# Patient Record
Sex: MALE | Employment: UNEMPLOYED | URBAN - METROPOLITAN AREA
[De-identification: names, ages, dates, MRNs, and addresses within clinical notes are randomized per-mention and may not be internally consistent; named-entity substitution may affect disease eponyms.]

---

## 2024-03-14 ENCOUNTER — APPOINTMENT (OUTPATIENT)
Dept: PRIMARY CARE | Facility: CLINIC | Age: 64
End: 2024-03-14
Payer: COMMERCIAL

## 2024-04-30 ENCOUNTER — APPOINTMENT (OUTPATIENT)
Dept: PRIMARY CARE | Facility: CLINIC | Age: 64
End: 2024-04-30
Payer: COMMERCIAL

## 2024-06-14 RX ORDER — CALCIUM CARBONATE/VITAMIN D3 600MG-62.5
1 CAPSULE ORAL 3 TIMES DAILY
COMMUNITY
Start: 2024-03-13

## 2024-06-14 RX ORDER — HYDROCHLOROTHIAZIDE 12.5 MG/1
12.5 CAPSULE ORAL DAILY
COMMUNITY
Start: 2024-03-13

## 2024-06-14 RX ORDER — AMLODIPINE AND BENAZEPRIL HYDROCHLORIDE 10; 40 MG/1; MG/1
1 CAPSULE ORAL DAILY
COMMUNITY
Start: 2024-03-13

## 2024-06-14 RX ORDER — OMEGA-3S/DHA/EPA/FISH OIL/D3 300MG-1000
2000 CAPSULE ORAL DAILY
COMMUNITY
Start: 2024-03-13

## 2024-06-17 ENCOUNTER — OFFICE VISIT (OUTPATIENT)
Dept: PRIMARY CARE | Facility: CLINIC | Age: 64
End: 2024-06-17
Payer: COMMERCIAL

## 2024-06-17 VITALS
DIASTOLIC BLOOD PRESSURE: 94 MMHG | RESPIRATION RATE: 16 BRPM | BODY MASS INDEX: 34.66 KG/M2 | WEIGHT: 234 LBS | HEART RATE: 86 BPM | TEMPERATURE: 97.2 F | OXYGEN SATURATION: 95 % | SYSTOLIC BLOOD PRESSURE: 148 MMHG | HEIGHT: 69 IN

## 2024-06-17 DIAGNOSIS — Z11.3 SCREENING FOR STD (SEXUALLY TRANSMITTED DISEASE): ICD-10-CM

## 2024-06-17 DIAGNOSIS — R29.818 SUSPECTED SLEEP APNEA: ICD-10-CM

## 2024-06-17 DIAGNOSIS — I10 PRIMARY HYPERTENSION: ICD-10-CM

## 2024-06-17 DIAGNOSIS — R73.03 PREDIABETES: Primary | ICD-10-CM

## 2024-06-17 DIAGNOSIS — I1A.0 RESISTANT HYPERTENSION: ICD-10-CM

## 2024-06-17 DIAGNOSIS — Z85.72 H/O DIFFUSE LARGE B-CELL LYMPHOMA: ICD-10-CM

## 2024-06-17 DIAGNOSIS — E78.6 LOW HDL (UNDER 40): ICD-10-CM

## 2024-06-17 DIAGNOSIS — J30.2 SEASONAL ALLERGIES: ICD-10-CM

## 2024-06-17 LAB
ALBUMIN SERPL BCP-MCNC: 4.6 G/DL (ref 3.4–5)
ALP SERPL-CCNC: 52 U/L (ref 33–136)
ALT SERPL W P-5'-P-CCNC: 30 U/L (ref 10–52)
ANION GAP SERPL CALC-SCNC: 17 MMOL/L (ref 10–20)
AST SERPL W P-5'-P-CCNC: 24 U/L (ref 9–39)
BILIRUB SERPL-MCNC: 1.3 MG/DL (ref 0–1.2)
BUN SERPL-MCNC: 15 MG/DL (ref 6–23)
CALCIUM SERPL-MCNC: 10 MG/DL (ref 8.6–10.6)
CHLORIDE SERPL-SCNC: 107 MMOL/L (ref 98–107)
CO2 SERPL-SCNC: 22 MMOL/L (ref 21–32)
CREAT SERPL-MCNC: 1.05 MG/DL (ref 0.5–1.3)
EGFRCR SERPLBLD CKD-EPI 2021: 79 ML/MIN/1.73M*2
ERYTHROCYTE [DISTWIDTH] IN BLOOD BY AUTOMATED COUNT: 13.5 % (ref 11.5–14.5)
GLUCOSE SERPL-MCNC: 116 MG/DL (ref 74–99)
HCT VFR BLD AUTO: 45.4 % (ref 41–52)
HGB BLD-MCNC: 14.6 G/DL (ref 13.5–17.5)
MCH RBC QN AUTO: 29.5 PG (ref 26–34)
MCHC RBC AUTO-ENTMCNC: 32.2 G/DL (ref 32–36)
MCV RBC AUTO: 92 FL (ref 80–100)
NRBC BLD-RTO: 0 /100 WBCS (ref 0–0)
PLATELET # BLD AUTO: 292 X10*3/UL (ref 150–450)
POTASSIUM SERPL-SCNC: 4 MMOL/L (ref 3.5–5.3)
PROT SERPL-MCNC: 7.5 G/DL (ref 6.4–8.2)
RBC # BLD AUTO: 4.95 X10*6/UL (ref 4.5–5.9)
SODIUM SERPL-SCNC: 142 MMOL/L (ref 136–145)
WBC # BLD AUTO: 5 X10*3/UL (ref 4.4–11.3)

## 2024-06-17 PROCEDURE — 99204 OFFICE O/P NEW MOD 45 MIN: CPT | Performed by: NURSE PRACTITIONER

## 2024-06-17 PROCEDURE — 84154 ASSAY OF PSA FREE: CPT | Performed by: NURSE PRACTITIONER

## 2024-06-17 PROCEDURE — 87491 CHLMYD TRACH DNA AMP PROBE: CPT | Performed by: NURSE PRACTITIONER

## 2024-06-17 PROCEDURE — 36415 COLL VENOUS BLD VENIPUNCTURE: CPT | Performed by: NURSE PRACTITIONER

## 2024-06-17 PROCEDURE — 87661 TRICHOMONAS VAGINALIS AMPLIF: CPT | Performed by: NURSE PRACTITIONER

## 2024-06-17 PROCEDURE — 86780 TREPONEMA PALLIDUM: CPT | Performed by: NURSE PRACTITIONER

## 2024-06-17 PROCEDURE — 3077F SYST BP >= 140 MM HG: CPT | Performed by: NURSE PRACTITIONER

## 2024-06-17 PROCEDURE — 87389 HIV-1 AG W/HIV-1&-2 AB AG IA: CPT | Performed by: NURSE PRACTITIONER

## 2024-06-17 PROCEDURE — 85027 COMPLETE CBC AUTOMATED: CPT | Performed by: NURSE PRACTITIONER

## 2024-06-17 PROCEDURE — 84075 ASSAY ALKALINE PHOSPHATASE: CPT | Performed by: NURSE PRACTITIONER

## 2024-06-17 PROCEDURE — 84443 ASSAY THYROID STIM HORMONE: CPT | Performed by: NURSE PRACTITIONER

## 2024-06-17 PROCEDURE — 99214 OFFICE O/P EST MOD 30 MIN: CPT | Performed by: NURSE PRACTITIONER

## 2024-06-17 PROCEDURE — 3080F DIAST BP >= 90 MM HG: CPT | Performed by: NURSE PRACTITIONER

## 2024-06-17 ASSESSMENT — PATIENT HEALTH QUESTIONNAIRE - PHQ9
SUM OF ALL RESPONSES TO PHQ9 QUESTIONS 1 AND 2: 0
1. LITTLE INTEREST OR PLEASURE IN DOING THINGS: NOT AT ALL
2. FEELING DOWN, DEPRESSED OR HOPELESS: NOT AT ALL

## 2024-06-17 ASSESSMENT — ENCOUNTER SYMPTOMS
DEPRESSION: 0
LOSS OF SENSATION IN FEET: 0
OCCASIONAL FEELINGS OF UNSTEADINESS: 0

## 2024-06-17 ASSESSMENT — PAIN SCALES - GENERAL: PAINLEVEL: 0-NO PAIN

## 2024-06-17 NOTE — PATIENT INSTRUCTIONS
Thank you for coming in for your visit today!    Please follow up in 4 months for blood pressure follow up    Call to schedule with sleep medicine to rule out sleep apnea.    Today we completed blood work. We will contact you with any abnormalities from this testing.    For your blood pressure:  Take your medication as directed. Try to take it around the same time daily.   Keep a log of your blood pressure. Be sure to bring it with you to your next appointment so we can review it together.  Adhere to the DASH diet. This includes decreasing your salt/sodium intake. Avoid canned foods, lunch meats, and frozen foods.  Exercise for 30 minutes daily.    A nutritionist should contact you for scheduling.     Call 911 or go to the emergency room if you have pain in your chest, difficulty breathing, or other life threatening symptoms.

## 2024-06-18 LAB
C TRACH RRNA SPEC QL NAA+PROBE: NEGATIVE
HIV 1+2 AB+HIV1 P24 AG SERPL QL IA: NONREACTIVE
N GONORRHOEA DNA SPEC QL PROBE+SIG AMP: NEGATIVE
T VAGINALIS RRNA SPEC QL NAA+PROBE: NEGATIVE
TREPONEMA PALLIDUM IGG+IGM AB [PRESENCE] IN SERUM OR PLASMA BY IMMUNOASSAY: NONREACTIVE
TSH SERPL-ACNC: 0.79 MIU/L (ref 0.44–3.98)

## 2024-06-19 LAB
PSA FREE MFR SERPL: 29 %
PSA FREE SERPL-MCNC: 0.8 NG/ML
PSA SERPL IA-MCNC: 2.8 NG/ML (ref 0–4)

## 2024-06-20 RX ORDER — FLUTICASONE PROPIONATE 50 MCG
1 SPRAY, SUSPENSION (ML) NASAL DAILY
Qty: 16 G | Refills: 11 | Status: SHIPPED | OUTPATIENT
Start: 2024-06-20 | End: 2025-06-20

## 2024-06-20 RX ORDER — CETIRIZINE HYDROCHLORIDE 10 MG/1
10 TABLET ORAL DAILY
Qty: 30 TABLET | Refills: 2 | Status: SHIPPED | OUTPATIENT
Start: 2024-06-20 | End: 2024-09-18

## 2024-07-19 ENCOUNTER — APPOINTMENT (OUTPATIENT)
Dept: NUTRITION | Facility: HOSPITAL | Age: 64
End: 2024-07-19
Payer: COMMERCIAL

## 2024-07-23 DIAGNOSIS — I10 PRIMARY HYPERTENSION: ICD-10-CM

## 2024-07-23 RX ORDER — AMLODIPINE AND BENAZEPRIL HYDROCHLORIDE 10; 40 MG/1; MG/1
1 CAPSULE ORAL DAILY
Qty: 90 CAPSULE | Refills: 0 | Status: SHIPPED | OUTPATIENT
Start: 2024-07-23

## 2024-07-23 RX ORDER — CALCIUM CARBONATE/VITAMIN D3 600MG-62.5
1 CAPSULE ORAL 3 TIMES DAILY
Qty: 90 CAPSULE | Refills: 0 | Status: SHIPPED | OUTPATIENT
Start: 2024-07-23

## 2024-08-23 ENCOUNTER — APPOINTMENT (OUTPATIENT)
Dept: NUTRITION | Facility: HOSPITAL | Age: 64
End: 2024-08-23
Payer: COMMERCIAL

## 2024-09-30 ENCOUNTER — APPOINTMENT (OUTPATIENT)
Dept: SLEEP MEDICINE | Facility: HOSPITAL | Age: 64
End: 2024-09-30
Payer: COMMERCIAL

## 2024-12-10 DIAGNOSIS — I10 PRIMARY HYPERTENSION: ICD-10-CM

## 2024-12-10 RX ORDER — AMLODIPINE AND BENAZEPRIL HYDROCHLORIDE 10; 40 MG/1; MG/1
1 CAPSULE ORAL DAILY
Qty: 30 CAPSULE | Refills: 1 | Status: SHIPPED | OUTPATIENT
Start: 2024-12-10

## 2025-05-27 ENCOUNTER — OFFICE VISIT (OUTPATIENT)
Dept: GERIATRIC MEDICINE | Facility: CLINIC | Age: 65
End: 2025-05-27
Payer: COMMERCIAL

## 2025-05-27 VITALS
DIASTOLIC BLOOD PRESSURE: 103 MMHG | RESPIRATION RATE: 16 BRPM | HEART RATE: 90 BPM | SYSTOLIC BLOOD PRESSURE: 148 MMHG | HEIGHT: 68 IN | WEIGHT: 214 LBS | BODY MASS INDEX: 32.43 KG/M2 | TEMPERATURE: 98.2 F | OXYGEN SATURATION: 97 %

## 2025-05-27 DIAGNOSIS — Z11.3 SCREEN FOR STD (SEXUALLY TRANSMITTED DISEASE): ICD-10-CM

## 2025-05-27 DIAGNOSIS — Z01.89 ENCOUNTER FOR GERIATRIC ASSESSMENT: Primary | ICD-10-CM

## 2025-05-27 DIAGNOSIS — R35.1 NOCTURIA MORE THAN TWICE PER NIGHT: ICD-10-CM

## 2025-05-27 DIAGNOSIS — Z01.00 NORMAL EYE AND VISION EXAM: ICD-10-CM

## 2025-05-27 DIAGNOSIS — Z13.220 SCREENING FOR CHOLESTEROL LEVEL: ICD-10-CM

## 2025-05-27 DIAGNOSIS — Z13.29 SCREENING FOR HYPOTHYROIDISM: ICD-10-CM

## 2025-05-27 DIAGNOSIS — Z15.09: ICD-10-CM

## 2025-05-27 DIAGNOSIS — I10 HYPERTENSION, UNSPECIFIED TYPE: ICD-10-CM

## 2025-05-27 DIAGNOSIS — Z00.00 HEALTHCARE MAINTENANCE: ICD-10-CM

## 2025-05-27 DIAGNOSIS — Z12.11 COLON CANCER SCREENING: ICD-10-CM

## 2025-05-27 DIAGNOSIS — Z12.11 ENCOUNTER FOR SCREENING FOR MALIGNANT NEOPLASM OF COLON: ICD-10-CM

## 2025-05-27 DIAGNOSIS — B35.1 FUNGAL INFECTION OF NAIL: ICD-10-CM

## 2025-05-27 DIAGNOSIS — Z86.0100 PERSONAL HISTORY OF COLON POLYPS, UNSPECIFIED: ICD-10-CM

## 2025-05-27 DIAGNOSIS — Z12.5 SCREENING FOR PROSTATE CANCER: ICD-10-CM

## 2025-05-27 DIAGNOSIS — Z13.21 ENCOUNTER FOR VITAMIN DEFICIENCY SCREENING: ICD-10-CM

## 2025-05-27 DIAGNOSIS — I10 PRIMARY HYPERTENSION: ICD-10-CM

## 2025-05-27 DIAGNOSIS — Z59.41 FOOD INSECURITY: ICD-10-CM

## 2025-05-27 PROBLEM — R31.9 HEMATURIA: Status: ACTIVE | Noted: 2021-12-30

## 2025-05-27 PROBLEM — Z86.19 HISTORY OF HEPATITIS C: Status: ACTIVE | Noted: 2019-10-11

## 2025-05-27 PROBLEM — R73.03 PREDIABETES: Status: ACTIVE | Noted: 2017-09-28

## 2025-05-27 PROBLEM — J38.7 DISEASE OF LARYNX: Status: ACTIVE | Noted: 2020-03-02

## 2025-05-27 PROBLEM — S43.422A SPRAIN OF LEFT ROTATOR CUFF CAPSULE: Status: ACTIVE | Noted: 2021-12-30

## 2025-05-27 PROBLEM — R49.0 MUSCLE TENSION DYSPHONIA: Status: ACTIVE | Noted: 2022-01-21

## 2025-05-27 PROBLEM — R59.0 CERVICAL LYMPHADENOPATHY: Status: ACTIVE | Noted: 2020-03-02

## 2025-05-27 PROBLEM — R76.8 HEPATITIS C ANTIBODY TEST POSITIVE: Status: ACTIVE | Noted: 2019-07-24

## 2025-05-27 PROBLEM — K62.5 RECTAL HEMORRHAGE: Status: ACTIVE | Noted: 2019-11-06

## 2025-05-27 PROBLEM — E66.9 OBESITY WITH BODY MASS INDEX 30 OR GREATER: Status: ACTIVE | Noted: 2018-06-08

## 2025-05-27 PROBLEM — M19.012 GLENOHUMERAL ARTHRITIS, LEFT: Status: ACTIVE | Noted: 2022-04-25

## 2025-05-27 PROBLEM — Z22.7 TB LUNG, LATENT: Status: ACTIVE | Noted: 2019-10-11

## 2025-05-27 PROBLEM — G89.29 CHRONIC RIGHT SHOULDER PAIN: Status: ACTIVE | Noted: 2022-04-25

## 2025-05-27 PROBLEM — R09.A2 GLOBUS SENSATION: Status: ACTIVE | Noted: 2022-01-21

## 2025-05-27 PROBLEM — M25.511 CHRONIC RIGHT SHOULDER PAIN: Status: ACTIVE | Noted: 2022-04-25

## 2025-05-27 PROCEDURE — 99215 OFFICE O/P EST HI 40 MIN: CPT | Performed by: NURSE PRACTITIONER

## 2025-05-27 PROCEDURE — 99417 PROLNG OP E/M EACH 15 MIN: CPT | Performed by: NURSE PRACTITIONER

## 2025-05-27 PROCEDURE — 36415 COLL VENOUS BLD VENIPUNCTURE: CPT | Performed by: NURSE PRACTITIONER

## 2025-05-27 RX ORDER — POLYETHYLENE GLYCOL 3350, SODIUM SULFATE ANHYDROUS, SODIUM BICARBONATE, SODIUM CHLORIDE, POTASSIUM CHLORIDE 236; 22.74; 6.74; 5.86; 2.97 G/4L; G/4L; G/4L; G/4L; G/4L
4 POWDER, FOR SOLUTION ORAL ONCE
Qty: 4000 ML | Refills: 0 | Status: SHIPPED | OUTPATIENT
Start: 2025-05-27 | End: 2025-05-27

## 2025-05-27 RX ORDER — AMLODIPINE AND BENAZEPRIL HYDROCHLORIDE 10; 40 MG/1; MG/1
1 CAPSULE ORAL DAILY
Qty: 90 CAPSULE | Refills: 3 | Status: SHIPPED | OUTPATIENT
Start: 2025-05-27 | End: 2026-05-27

## 2025-05-27 RX ORDER — HYDROCHLOROTHIAZIDE 12.5 MG/1
12.5 CAPSULE ORAL DAILY
Qty: 90 CAPSULE | Refills: 3 | Status: SHIPPED | OUTPATIENT
Start: 2025-05-27 | End: 2026-05-27

## 2025-05-27 RX ORDER — ACETAMINOPHEN 500 MG
1 TABLET ORAL ONCE AS NEEDED
Qty: 1 KIT | Refills: 0 | Status: SHIPPED | OUTPATIENT
Start: 2025-05-27

## 2025-05-27 SDOH — ECONOMIC STABILITY: FOOD INSECURITY: WITHIN THE PAST 12 MONTHS, THE FOOD YOU BOUGHT JUST DIDN'T LAST AND YOU DIDN'T HAVE MONEY TO GET MORE.: NEVER TRUE

## 2025-05-27 SDOH — ECONOMIC STABILITY: FOOD INSECURITY: WITHIN THE PAST 12 MONTHS, YOU WORRIED THAT YOUR FOOD WOULD RUN OUT BEFORE YOU GOT MONEY TO BUY MORE.: NEVER TRUE

## 2025-05-27 SDOH — ECONOMIC STABILITY: FOOD INSECURITY: WITHIN THE PAST 12 MONTHS, YOU WORRIED THAT YOUR FOOD WOULD RUN OUT BEFORE YOU GOT MONEY TO BUY MORE.: SOMETIMES TRUE

## 2025-05-27 SDOH — ECONOMIC STABILITY: FOOD INSECURITY: WITHIN THE PAST 12 MONTHS, THE FOOD YOU BOUGHT JUST DIDN'T LAST AND YOU DIDN'T HAVE MONEY TO GET MORE.: SOMETIMES TRUE

## 2025-05-27 SDOH — ECONOMIC STABILITY - FOOD INSECURITY: FOOD INSECURITY: Z59.41

## 2025-05-27 ASSESSMENT — ACTIVITIES OF DAILY LIVING (ADL)
GROCERY_SHOPPING: INDEPENDENT
HEARING - RIGHT EAR: FUNCTIONAL
PATIENT'S MEMORY ADEQUATE TO SAFELY COMPLETE DAILY ACTIVITIES?: YES
USING_TELEPHONE: INDEPENDENT
WALKS IN HOME: INDEPENDENT
DRESSING YOURSELF: INDEPENDENT
PILL_BOX_USED: NO
USING_TRANSPORTATION: INDEPENDENT
BATHING: INDEPENDENT
TAKING_MEDICATION: INDEPENDENT
HEARING - LEFT EAR: FUNCTIONAL
PREPARING_MEALS: INDEPENDENT
STILL_DRIVING: NO
TOILETING: INDEPENDENT
ADEQUATE_TO_COMPLETE_ADL: YES
MANAGING_FINANCES: INDEPENDENT
FEEDING YOURSELF: INDEPENDENT
DOING_HOUSEWORK: INDEPENDENT
EATING: INDEPENDENT
JUDGMENT_ADEQUATE_SAFELY_COMPLETE_DAILY_ACTIVITIES: YES
NEEDS_ASSISTANCE_WITH_FOOD: INDEPENDENT
GROOMING: INDEPENDENT

## 2025-05-27 ASSESSMENT — GERIATRIC DEPRESSION SCALE SHORT VERSION (GDS-SV)
DO YOU FEEL YOU HAVE MORE PROBLEMS WITH MEMORY THAN MOST: NO
DO YOU FEEL FULL OF ENERGY: YES
DO YOU OFTEN FEEL HELPLESS: NO
ARE YOU BASICALLY SATISFIED WITH YOUR LIFE: YES
DO YOU THINK THAT MOST PEOPLE ARE BETTER OFF THAN YOU ARE: NO
DO YOU FEEL HAPPY MOST OF THE TIME: YES
GDS TOTAL SCORE: 0
DO YOU OFTEN GET BORED: NO
DO YOU THINK IT IS WONDERFUL TO BE ALIVE NOW: YES
DO YOU FEEL THAT YOUR LIFE IS EMPTY: NO
DO YOU FEEL PRETTY WORTHLESS THE WAY YOU ARE NOW: NO
ARE YOU IN GOOD SPIRITS MOST OF THE TIME: YES
ARE YOU AFRAID THAT SOMETHING BAD IS GOING TO HAPPEN TO YOU: NO
DO YOU PREFER TO STAY AT HOME, RATHER THAN GOING OUT AND DOING NEW THINGS: NO
DO YOU FEEL THAT YOUR SITUATION IS HOPELESS: NO
HAVE YOU DROPPED MANY OF YOUR ACTIVITIES AND INTERESTS?: NO

## 2025-05-27 ASSESSMENT — ENCOUNTER SYMPTOMS
LOSS OF SENSATION IN FEET: 0
DEPRESSION: 0
OCCASIONAL FEELINGS OF UNSTEADINESS: 0

## 2025-05-27 ASSESSMENT — LIFESTYLE VARIABLES: HOW MANY STANDARD DRINKS CONTAINING ALCOHOL DO YOU HAVE ON A TYPICAL DAY: PATIENT DOES NOT DRINK

## 2025-05-27 ASSESSMENT — PATIENT HEALTH QUESTIONNAIRE - PHQ9
1. LITTLE INTEREST OR PLEASURE IN DOING THINGS: NOT AT ALL
2. FEELING DOWN, DEPRESSED OR HOPELESS: NOT AT ALL
SUM OF ALL RESPONSES TO PHQ9 QUESTIONS 1 AND 2: 0

## 2025-05-27 ASSESSMENT — PAIN SCALES - GENERAL: PAINLEVEL_OUTOF10: 0-NO PAIN

## 2025-05-27 NOTE — PROGRESS NOTES
"    Division: Geriatrics  Date of Service: 25   Visit Type: [facility] - Initial Visit  Referral requested by:  self    Chief complaint:  New Patient Visit, Hypertension, Exposure to STD, Prediabetes, and Med Refill.     Subjective   Mr. Roni Nascimento is 64 y.o. year old male and here for comprehensive geriatric assessment.  Patient is new to me but seen previously by Maryan Manuel CNP. Mr. Roni Nascimento has a PMH of:  Primary hypertension; Prediabetes; H/O diffuse large B-cell lymphoma; Hep C antibody test positive.    Here with self. Additional history provided by: EMR due to NA.    Previous Note: 2024, Maryan Manuel CNP  Subjective   Roni Nascimento is a 63 y.o. male who presents for new patient visit   HPI  Mr. Nascimento is a 62 yo M here today for NPV.    Patient with reported b-cell lymphoma in  (in remission), HTN with noncompliance, GERD, and prediabetes.     He was previously taking blood pressure medications.   Denies headache, chest pain, palpitations, blurred vision, or dizziness    He last had blood work last year with prediabetes noted. Denies polydipsia, polyuria, headache, blurred vision, or lightheadedness    Notes that he has been \"in transition\" and has just recently made changes to his diet   Family history: Grandfather  of some form of cancer in his 80s.   Father and brother with diabetes, and  of complications with diabetes.     Tobacco: Denies  ETOH: Denies   Illicits: Denies     Notes seasonal allergies for the last few weeks with runny nose and some eye drops.   He did take a medication that was OTC     GERD vs. PND- he has historically taken medication PRN for GERD.     All systems reviewed. Review of systems negative except for noted positives in HPI    Objective     BP (!) 148/94   Pulse 86   Temp 36.2 °C (97.2 °F)   Resp 16   Ht 1.753 m (5' 9\")   Wt 106 kg (234 lb)   SpO2 95%   BMI 34.56 kg/m²    Vital signs noted and reviewed.     Physical " Exam  Constitutional:       Appearance: Normal appearance.   Cardiovascular:      Rate and Rhythm: Normal rate and regular rhythm.   Pulmonary:      Effort: Pulmonary effort is normal. No respiratory distress.      Breath sounds: Normal breath sounds.   Skin:     General: Skin is warm and dry.   Neurological:      Mental Status: He is oriented to person, place, and time.   Psychiatric:         Mood and Affect: Mood normal.     Assessment/Plan   Problem List Items Addressed This Visit      H/O diffuse large B-cell lymphoma   Relevant Orders   PSA, total and free (Completed)   Primary hypertension   Relevant Orders   Referral to Nutrition Services   Comprehensive metabolic panel (Completed)   CBC (Completed)    Other Visit Diagnoses      Prediabetes    -  Primary   Relevant Orders   Referral to Nutrition Services   Comprehensive metabolic panel (Completed)   Suspected sleep apnea       Relevant Orders   Referral to Adult Sleep Medicine   Resistant hypertension       Relevant Orders   TSH with reflex to Free T4 if abnormal (Completed)   Low HDL (under 40)       Screening for STD (sexually transmitted disease)       Relevant Orders   Syphilis Screen with Reflex (Completed)   C. Trachomatis / N. Gonorrhoeae, Amplified Detection (Completed)   Trichomonas vaginalis, Nucleic Acid Detection (Completed)   HIV 1/2 Antigen/Antibody Screen with Reflex to Confirmation (Completed)   Seasonal allergies       Relevant Medications   fluticasone (Flonase) 50 mcg/actuation nasal spray   cetirizine (ZyrTEC) 10 mg tablet  ==========================================    HPI  Roni Nascimento is a pleasant 64 y.o. AA male who presents today for geriatric assessment and to establish care. This patient is a former patient of KATHRYN Bautista. Their last visit with LILIAM Manuel was 6/17/2024 for NPV. Today, patient presents for: New Patient Visit; Hypertension; Exposure to STD; Prediabetes; Med Refill. PMH significant for Primary hypertension;  Prediabetes; H/O diffuse large B-cell lymphoma; Hep C antibody test positive; arthritis; Hematuria; Muscle tension dysphonia; Obesity with body mass index 30 or greater; Sciatica; and TB lung, latent. Last brain imaging: None. MoCA/MMSE: None. Pt came alone so the history is as accurate as and limited by his own report/memory. Main concerns: has run out of all medications (previously Lost to Care), especially concerned about hypertension, needs updated labs, requesting STD screening tests. Shared family history of diabetes, personal h/o lymphoma, bilateral torn rotator cuffs, need to see podiatrist, eye doctor. Reports often runs out of food by the end of the month.         What matters most: Establishing care to get meds refilled    PCP: LUPE Blair-JUSTO    Medical records reviewed  Medical History[1]  Surgical History[2]  Family History[3]  Social History     Tobacco Use    Smoking status: Never    Smokeless tobacco: Never   Substance Use Topics    Alcohol use: Never       Geriatric ROS:  Visual: glasses No Last exam: 2023  Hearing: hearing N/A Last exam: NA  Dental: dentures No Last exam: 2024  Sleep: how many hours at night 6+, sleep aides: drinking too much, up to BR 2-3+  History of Frequent falls: no  Mood: Stable,     Review of Systems   Constitutional: Negative.    HENT:  Positive for voice change.    Eyes:  Positive for visual disturbance.   Respiratory: Negative.     Gastrointestinal: Negative.    Endocrine: Negative for cold intolerance, heat intolerance, polydipsia, polyphagia and polyuria.   Genitourinary:  Negative for difficulty urinating, dysuria, enuresis, genital sores, hematuria, penile discharge, penile pain, penile swelling and scrotal swelling.        Nocturia   Musculoskeletal:  Positive for arthralgias and myalgias (Bilateral shoulders, rotator cuffs).   Skin: Negative.    Allergic/Immunologic: Negative.    Neurological: Negative.  Negative for dizziness, seizures, facial asymmetry,  light-headedness and headaches.   Hematological: Negative.    Psychiatric/Behavioral:  Positive for sleep disturbance.    Geriatric: Positive for sleeping poorly. Negative for dementia, falls, hearing aid, bowel incontinence, memory loss, mobility equipment used and bladder incontinence.      ENCOUNTER SCREENING RESULTS             Over the past 2 weeks, how often have you been bothered by any of the following problems?  Little interest or pleasure in doing things: Not at all  Feeling down, depressed, or hopeless: Not at all  Patient Health Questionnaire-2 Score: 0       Geriatric Depression Scale (Short Version) Total: 0         MIS: NA/15    Clock Drawing Test (CDT): NA/7     Daily Functioning Assessment    ADL Screening  Patient's Vision Adequate to Safely Complete Daily Activities: Yes  Patient's Judgment Adequate to Safely Complete Daily Activities: Yes  Patient's Memory Adequate to Safely Complete Daily Activities: Yes  Patient Able to Express Needs/Desires: Yes  Which is your dominant hand?: Right  Dressing: Independent  Grooming: Independent  Feeding: Independent  Bathing: Independent  Toileting: Independent  In/Out Bed: Independent  Walks in Home: Independent  Weakness of Legs: None  Weakness of Arms/Hands: None  Hearing - Right Ear: Functional  Hearing - Left Ear: Functional     IADL's  Using Telephone: Independent  Grocery Shopping: Independent  Preparing Meals: Independent  Doing Housework: Independent  Laundry: Independent  Taking Medication: Independent  Pill Box Used: No  Managing Finances: Independent  Using Transportation: Independent  Still Driving: No  Eating: Independent  Needs Assistance With Food: Independent  Difficulty Chewing or Swallowing: No     Nutrition and Exercise  Current Diet: Well Balanced Diet  Adequate Fluid Intake: Yes  Caffeine: Yes  Appetite:: Good  Food Consistency:: Regular  Liquids Consistency:: Other  Changes in Weight?: Yes  Chewing or Swallowing Problems?: No  Exercise  "Frequency: Regularly        Safety  Steadi Fall Risk  One or more falls in the last year? No  How many Times?    Was the patient injured in the fall?    Has trouble stepping onto curb? No  Advised to use a cane or walker to get around safely? No  Often has to rush to toilet? No  Feels unsteady when walking? No  Has lost some feeling in feet? No  Often feels sad or depressed? No  Steadies self on furniture while walking at home? No  Takes medicine that makes them feel lightheaded or more tired than usual? No  Worried about Falling? No  Takes medicine to sleep or improve mood? No  Needs to push with hands when rising from a chair? No      Safety Concerns  Safety Concerns: None    Living situation:  Living Situation  Residence Type:: One Floor  Amenities:: Stairs to Entrance, Bathroom is Accessible, Commode Available, Shower/Tub Handicap Accessible      Advanced Directives on file: has NO advanced directive - not interested in additional information      Medications reviewed and reconciled.   Current Medications[4]    Objective   BP (!) 148/103   Pulse 90   Temp 36.8 °C (98.2 °F) (Temporal)   Resp 16   Ht 1.727 m (5' 8\")   Wt 97.1 kg (214 lb)   SpO2 97%   BMI 32.54 kg/m²   Physical Exam  Vitals reviewed.   Constitutional:       General: He is not in acute distress.     Appearance: Normal appearance. He is not ill-appearing, toxic-appearing or diaphoretic.   HENT:      Head: Normocephalic and atraumatic.      Right Ear: Hearing normal.      Left Ear: Hearing normal.   Eyes:      Conjunctiva/sclera: Conjunctivae normal.   Pulmonary:      Effort: Pulmonary effort is normal. No respiratory distress.   Musculoskeletal:         General: Normal range of motion.      Cervical back: Normal range of motion.   Neurological:      Mental Status: He is alert and oriented to person, place, and time.      Gait: Gait normal.   Psychiatric:         Attention and Perception: Attention and perception normal.         Mood and " "Affect: Mood and affect normal.         Speech: Speech normal.         Behavior: Behavior normal. Behavior is cooperative.         Cognition and Memory: Cognition normal.         Judgment: Judgment normal.       Labs/Imaging reviewed.  Lab Results   Component Value Date    WBC 5.5 05/27/2025    HGB 15.2 05/27/2025    HCT 47.6 05/27/2025    MCV 91.5 05/27/2025     05/27/2025    LYMPHOPCT 41.6 05/27/2025    RBC 5.20 05/27/2025    MCH 29.2 05/27/2025    MCHC 31.9 (L) 05/27/2025    RDW 13.0 05/27/2025     Lab Results   Component Value Date     05/27/2025    K 3.9 05/27/2025     05/27/2025    CO2 25 05/27/2025    BUN 15 05/27/2025    CREATININE 0.86 05/27/2025    GLUCOSE 85 05/27/2025    CALCIUM 9.4 05/27/2025    PROT 7.4 05/27/2025    BILITOT 1.0 05/27/2025    ALKPHOS 52 05/27/2025    AST 19 05/27/2025    ALT 19 05/27/2025     Lab Results   Component Value Date    TSH 0.79 06/17/2024     No results found for: \"FREET4\"  No results found for: \"WMQGVABR98\"  Lab Results   Component Value Date    HGBA1C 5.7 (H) 05/27/2025    HGBA1C 6.3 (H) 10/12/2023    HGBA1C 6.1 (H) 04/03/2023     No results found for: \"VITD25\"       Assessment/Plan    Problem List Items Addressed This Visit           ICD-10-CM    Primary hypertension I10    Relevant Medications    amLODIPine-benazepriL (Lotrel) 10-40 mg capsule    Other Relevant Orders    Follow Up In Geriatrics    Screen for STD (sexually transmitted disease) Z11.3    Relevant Orders    C. trachomatis / N. gonorrhoeae, Amplified, Urogenital (Completed)    HIV 1/2 Antigen/Antibody Screen with Reflex to Confirmation (Completed)    Hepatitis B Surface Antigen (Completed)    Hepatitis C Antibody (Completed)    Syphilis Screen with Reflex    Follow Up In Geriatrics    Encounter for geriatric assessment - Primary Z01.89    Relevant Orders    Follow Up In Geriatrics    Healthcare maintenance Z00.00    Relevant Medications    amLODIPine-benazepriL (Lotrel) 10-40 mg capsule    " hydroCHLOROthiazide (Microzide) 12.5 mg capsule    Other Relevant Orders    Vitamin B12 (Completed)    TSH with reflex to Free T4 if abnormal (Completed)    CBC and Auto Differential (Completed)    Comprehensive Metabolic Panel (Completed)    Magnesium (Completed)    Phosphorus (Completed)    Hemoglobin A1C (Completed)    Vitamin D 25-Hydroxy,Total (for eval of Vitamin D levels) (Completed)    PSA, total and free    Syphilis Screen with Reflex    Follow Up In Geriatrics    Genetic predisposition to malignant neoplasm Z15.09    Relevant Orders    Colonoscopy Screening; Average Risk Patient    Follow Up In Geriatrics    Personal history of colon polyps, unspecified Z86.0100    Relevant Orders    Colonoscopy Screening; Average Risk Patient    Follow Up In Geriatrics    Food insecurity Z59.41    Relevant Orders    Referral to [facility]    Follow Up In Geriatrics    Fungal infection of nail B35.1    Relevant Orders    Referral to Podiatry    Nocturia more than twice per night R35.1    Relevant Orders    Referral to Urology     Other Visit Diagnoses         Codes      Encounter for screening for malignant neoplasm of colon     Z12.11    Relevant Orders    Follow Up In Geriatrics      Screening for prostate cancer     Z12.5    Relevant Orders    PSA, total and free    Follow Up In Geriatrics      Hypertension, unspecified type     I10    Relevant Medications    amLODIPine-benazepriL (Lotrel) 10-40 mg capsule    hydroCHLOROthiazide (Microzide) 12.5 mg capsule    blood pressure monitor kit    Other Relevant Orders    CBC and Auto Differential (Completed)    Comprehensive Metabolic Panel (Completed)    Syphilis Screen with Reflex    Follow Up In Geriatrics      Screening for hypothyroidism     Z13.29    Relevant Orders    TSH with reflex to Free T4 if abnormal (Completed)    Follow Up In Geriatrics      Encounter for vitamin deficiency screening     Z13.21    Relevant Orders    Vitamin B12 (Completed)    Magnesium  (Completed)    Phosphorus (Completed)    Vitamin D 25-Hydroxy,Total (for eval of Vitamin D levels) (Completed)    Follow Up In Geriatrics      Colon cancer screening     Z12.11    Relevant Orders    Colonoscopy Screening; Average Risk Patient    Follow Up In Geriatrics      Screening for cholesterol level     Z13.220    Relevant Orders    Lipid Panel (Completed)    Follow Up In Geriatrics      Normal eye and vision exam     Z01.00    Relevant Orders    Referral to Ophthalmology            Discussed case with: patient,  facility staff (nurse, aid, PT/OT/SLP, SW and/or DON), and medical records    1. Encounter for geriatric assessment (Primary)  - Follow Up In Geriatrics; Future    2. Healthcare maintenance  - Vitamin B12  - TSH with reflex to Free T4 if abnormal  - CBC and Auto Differential  - Comprehensive Metabolic Panel  - Magnesium  - Phosphorus  - Hemoglobin A1C  - Vitamin D 25-Hydroxy,Total (for eval of Vitamin D levels)  - amLODIPine-benazepriL (Lotrel) 10-40 mg capsule; Take 1 capsule by mouth once daily.  Dispense: 90 capsule; Refill: 3  - hydroCHLOROthiazide (Microzide) 12.5 mg capsule; Take 1 capsule (12.5 mg) by mouth once daily.  Dispense: 90 capsule; Refill: 3  - PSA, total and free; Future  - Syphilis Screen with Reflex; Future  - PSA, total and free  - Syphilis Screen with Reflex  - Follow Up In Geriatrics; Future    3. Primary hypertension  - amLODIPine-benazepriL (Lotrel) 10-40 mg capsule; Take 1 capsule by mouth once daily.  Dispense: 90 capsule; Refill: 3  - Follow Up In Geriatrics; Future    4. Screen for STD (sexually transmitted disease)  - C. trachomatis / N. gonorrhoeae, Amplified, Urogenital  - HIV 1/2 Antigen/Antibody Screen with Reflex to Confirmation  - Hepatitis B Surface Antigen  - Hepatitis C Antibody  - Syphilis Screen with Reflex  - Follow Up In Geriatrics; Future    5. Encounter for screening for malignant neoplasm of colon  - Follow Up In Geriatrics; Future    6. Screening for  prostate cancer  - PSA, total and free; Future  - PSA, total and free  - Follow Up In Geriatrics; Future    7. Hypertension, unspecified type    - CBC and Auto Differential  - Comprehensive Metabolic Panel  - amLODIPine-benazepriL (Lotrel) 10-40 mg capsule; Take 1 capsule by mouth once daily.  Dispense: 90 capsule; Refill: 3  - hydroCHLOROthiazide (Microzide) 12.5 mg capsule; Take 1 capsule (12.5 mg) by mouth once daily.  Dispense: 90 capsule; Refill: 3  - Syphilis Screen with Reflex; Future  - Syphilis Screen with Reflex  - blood pressure monitor kit; 1 kit 1 time if needed (blood pressure) for up to 1 dose.  Dispense: 1 kit; Refill: 0  - Follow Up In Geriatrics; Future    8. Screening for hypothyroidism  - TSH with reflex to Free T4 if abnormal  - Follow Up In Geriatrics; Future    9. Encounter for vitamin deficiency screening  - Vitamin B12  - Magnesium  - Phosphorus  - Vitamin D 25-Hydroxy,Total (for eval of Vitamin D levels)  - Follow Up In Geriatrics; Future    10. Colon cancer screening  - Colonoscopy Screening; Average Risk Patient; Future  - polyethylene glycol (GoLYTELY) 236-22.74-6.74 -5.86 gram solution; Take 4,000 mL by mouth 1 time for 1 dose. See bowel prep instructions  Dispense: 4000 mL; Refill: 0  - Follow Up In Geriatrics; Future    11. Personal history of colon polyps, unspecified  - Colonoscopy Screening; Average Risk Patient; Future  - polyethylene glycol (GoLYTELY) 236-22.74-6.74 -5.86 gram solution; Take 4,000 mL by mouth 1 time for 1 dose. See bowel prep instructions  Dispense: 4000 mL; Refill: 0  - Follow Up In Geriatrics; Future    12. Genetic predisposition to malignant neoplasm  - Colonoscopy Screening; Average Risk Patient; Future  - polyethylene glycol (GoLYTELY) 236-22.74-6.74 -5.86 gram solution; Take 4,000 mL by mouth 1 time for 1 dose. See bowel prep instructions  Dispense: 4000 mL; Refill: 0  - Follow Up In Geriatrics; Future    13. Screening for cholesterol level  - Lipid  Panel  - Follow Up In Geriatrics; Future    14. Food insecurity  - Referral to [facility]; Future  - Follow Up In Geriatrics; Future    15. Fungal infection of nail  - Referral to Podiatry; Future    16. Nocturia more than twice per night  - Referral to Urology; Future    17. Normal eye and vision exam  - Referral to Ophthalmology; Future       Time Spent  Prep time on day of patient encounter: 1 minutes  Time spent directly with patient, family or caregiver: 45 minutes  Additional Time Spent on Patient Care Activities: 15 minutes (Prescribing meds, referring to podiatry, urology, ophthalmology/optometry, [facility], ordering labs)  Documentation Time: 30 minutes  Other Time Spent: 0 minutes  Total: 91 minutes    Electronically signed by: KATHRYN Blair          [1] No past medical history on file.  [2] No past surgical history on file.  [3] No family history on file.  [4]   Current Outpatient Medications   Medication Sig Dispense Refill    Fish OiL 1,200 (144-216) mg capsule Take 1 capsule (1,200 mg) by mouth 3 times a day. 90 capsule 0    Vitamin D3 50 mcg (2,000 unit) tablet Take 1 tablet (2,000 Units) by mouth once daily.      amLODIPine-benazepriL (Lotrel) 10-40 mg capsule Take 1 capsule by mouth once daily. 90 capsule 3    blood pressure monitor kit 1 kit 1 time if needed (blood pressure) for up to 1 dose. 1 kit 0    cetirizine (ZyrTEC) 10 mg tablet Take 1 tablet (10 mg) by mouth once daily. 30 tablet 2    fluticasone (Flonase) 50 mcg/actuation nasal spray Administer 1 spray into each nostril once daily. Shake gently. Before first use, prime pump. After use, clean tip and replace cap. (Patient not taking: Reported on 5/27/2025) 16 g 11    hydroCHLOROthiazide (Microzide) 12.5 mg capsule Take 1 capsule (12.5 mg) by mouth once daily. 90 capsule 3     No current facility-administered medications for this visit.

## 2025-05-28 PROBLEM — R35.1 NOCTURIA MORE THAN TWICE PER NIGHT: Status: ACTIVE | Noted: 2025-05-28

## 2025-05-28 PROBLEM — Z59.41 FOOD INSECURITY: Status: ACTIVE | Noted: 2025-05-28

## 2025-05-28 PROBLEM — Z00.00 HEALTHCARE MAINTENANCE: Status: ACTIVE | Noted: 2025-05-28

## 2025-05-28 PROBLEM — Z86.0100 PERSONAL HISTORY OF COLON POLYPS, UNSPECIFIED: Status: ACTIVE | Noted: 2025-05-28

## 2025-05-28 PROBLEM — B35.1 FUNGAL INFECTION OF NAIL: Status: ACTIVE | Noted: 2025-05-28

## 2025-05-28 PROBLEM — Z11.3 SCREEN FOR STD (SEXUALLY TRANSMITTED DISEASE): Status: ACTIVE | Noted: 2025-05-28

## 2025-05-28 PROBLEM — Z15.09: Status: ACTIVE | Noted: 2025-05-28

## 2025-05-28 PROBLEM — Z01.89 ENCOUNTER FOR GERIATRIC ASSESSMENT: Status: ACTIVE | Noted: 2025-05-28

## 2025-05-28 LAB
CHOLEST SERPL-MCNC: 153 MG/DL
CHOLEST/HDLC SERPL: 3.7 (CALC)
HDLC SERPL-MCNC: 41 MG/DL
LDLC SERPL CALC-MCNC: 96 MG/DL (CALC)
NONHDLC SERPL-MCNC: 112 MG/DL (CALC)
TRIGL SERPL-MCNC: 75 MG/DL

## 2025-05-28 ASSESSMENT — ENCOUNTER SYMPTOMS
MEMORY LOSS: 0
LIGHT-HEADEDNESS: 0
POLYDIPSIA: 0
ARTHRALGIAS: 1
FACIAL ASYMMETRY: 0
HEMATOLOGIC/LYMPHATIC NEGATIVE: 1
POLYPHAGIA: 0
DIFFICULTY URINATING: 0
GASTROINTESTINAL NEGATIVE: 1
DYSURIA: 0
FALLS: 0
SEIZURES: 0
VOICE CHANGE: 1
DIZZINESS: 0
SLEEP DISTURBANCE: 1
HEMATURIA: 0
CONSTITUTIONAL NEGATIVE: 1
HEADACHES: 0
NEUROLOGICAL NEGATIVE: 1
RESPIRATORY NEGATIVE: 1
MYALGIAS: 1
BOWEL INCONTINENCE: 0
ALLERGIC/IMMUNOLOGIC NEGATIVE: 1

## 2025-05-29 LAB
PSA FREE MFR SERPL: 19 % (CALC)
PSA FREE SERPL-MCNC: 0.6 NG/ML
PSA SERPL-MCNC: 3.2 NG/ML

## 2025-05-31 LAB
25(OH)D3+25(OH)D2 SERPL-MCNC: 31 NG/ML (ref 30–100)
ALBUMIN SERPL-MCNC: 4.6 G/DL (ref 3.6–5.1)
ALP SERPL-CCNC: 52 U/L (ref 35–144)
ALT SERPL-CCNC: 19 U/L (ref 9–46)
ANION GAP SERPL CALCULATED.4IONS-SCNC: 10 MMOL/L (CALC) (ref 7–17)
AST SERPL-CCNC: 19 U/L (ref 10–35)
BASOPHILS # BLD AUTO: 72 CELLS/UL (ref 0–200)
BASOPHILS NFR BLD AUTO: 1.3 %
BILIRUB SERPL-MCNC: 1 MG/DL (ref 0.2–1.2)
BUN SERPL-MCNC: 15 MG/DL (ref 7–25)
C TRACH RRNA SPEC QL NAA+PROBE: NOT DETECTED
CALCIUM SERPL-MCNC: 9.4 MG/DL (ref 8.6–10.3)
CHLORIDE SERPL-SCNC: 105 MMOL/L (ref 98–110)
CO2 SERPL-SCNC: 25 MMOL/L (ref 20–32)
CREAT SERPL-MCNC: 0.86 MG/DL (ref 0.7–1.35)
EGFRCR SERPLBLD CKD-EPI 2021: 96 ML/MIN/1.73M2
EOSINOPHIL # BLD AUTO: 149 CELLS/UL (ref 15–500)
EOSINOPHIL NFR BLD AUTO: 2.7 %
ERYTHROCYTE [DISTWIDTH] IN BLOOD BY AUTOMATED COUNT: 13 % (ref 11–15)
EST. AVERAGE GLUCOSE BLD GHB EST-MCNC: 117 MG/DL
EST. AVERAGE GLUCOSE BLD GHB EST-SCNC: 6.5 MMOL/L
GLUCOSE SERPL-MCNC: 85 MG/DL (ref 65–99)
HBA1C MFR BLD: 5.7 %
HBV SURFACE AG SERPL QL IA: NORMAL
HCT VFR BLD AUTO: 47.6 % (ref 38.5–50)
HCV AB SERPL QL IA: REACTIVE
HCV RNA SERPL NAA+PROBE-ACNC: ABNORMAL IU/ML
HCV RNA SERPL NAA+PROBE-LOG IU: ABNORMAL LOG IU/ML
HGB BLD-MCNC: 15.2 G/DL (ref 13.2–17.1)
HIV 1+2 AB+HIV1 P24 AG SERPL QL IA: NORMAL
LYMPHOCYTES # BLD AUTO: 2288 CELLS/UL (ref 850–3900)
LYMPHOCYTES NFR BLD AUTO: 41.6 %
MAGNESIUM SERPL-MCNC: 2.3 MG/DL (ref 1.5–2.5)
MCH RBC QN AUTO: 29.2 PG (ref 27–33)
MCHC RBC AUTO-ENTMCNC: 31.9 G/DL (ref 32–36)
MCV RBC AUTO: 91.5 FL (ref 80–100)
MONOCYTES # BLD AUTO: 391 CELLS/UL (ref 200–950)
MONOCYTES NFR BLD AUTO: 7.1 %
N GONORRHOEA RRNA SPEC QL NAA+PROBE: NOT DETECTED
NEUTROPHILS # BLD AUTO: 2602 CELLS/UL (ref 1500–7800)
NEUTROPHILS NFR BLD AUTO: 47.3 %
PHOSPHATE SERPL-MCNC: 3.8 MG/DL (ref 2.1–4.3)
PLATELET # BLD AUTO: 331 THOUSAND/UL (ref 140–400)
PMV BLD REES-ECKER: 9.2 FL (ref 7.5–12.5)
POTASSIUM SERPL-SCNC: 3.9 MMOL/L (ref 3.5–5.3)
PROT SERPL-MCNC: 7.4 G/DL (ref 6.1–8.1)
QUEST GC CT AMPLIFIED (ALWAYS MESSAGE): NORMAL
QUEST HCV PCR (ALWAYS MESSAGE): ABNORMAL
RBC # BLD AUTO: 5.2 MILLION/UL (ref 4.2–5.8)
SODIUM SERPL-SCNC: 140 MMOL/L (ref 135–146)
TSH SERPL-ACNC: 0.89 MIU/L (ref 0.4–4.5)
VIT B12 SERPL-MCNC: 276 PG/ML (ref 200–1100)
WBC # BLD AUTO: 5.5 THOUSAND/UL (ref 3.8–10.8)

## 2025-06-03 LAB — T PALLIDUM AB SER QL IA: NEGATIVE

## 2025-07-08 ENCOUNTER — APPOINTMENT (OUTPATIENT)
Dept: UROLOGY | Facility: CLINIC | Age: 65
End: 2025-07-08
Payer: COMMERCIAL

## 2025-07-08 VITALS — TEMPERATURE: 97.4 F

## 2025-07-08 DIAGNOSIS — R31.29 MICROSCOPIC HEMATURIA: ICD-10-CM

## 2025-07-08 DIAGNOSIS — R35.1 NOCTURIA MORE THAN TWICE PER NIGHT: Primary | ICD-10-CM

## 2025-07-08 DIAGNOSIS — R97.20 ELEVATED PSA: ICD-10-CM

## 2025-07-08 LAB
POC APPEARANCE, URINE: CLEAR
POC BILIRUBIN, URINE: NEGATIVE
POC BLOOD, URINE: ABNORMAL
POC COLOR, URINE: YELLOW
POC GLUCOSE, URINE: NEGATIVE MG/DL
POC KETONES, URINE: NEGATIVE MG/DL
POC LEUKOCYTES, URINE: NEGATIVE
POC NITRITE,URINE: NEGATIVE
POC PH, URINE: 6.5 PH
POC PROTEIN, URINE: NEGATIVE MG/DL
POC SPECIFIC GRAVITY, URINE: 1.01
POC UROBILINOGEN, URINE: 0.2 EU/DL

## 2025-07-08 PROCEDURE — 1126F AMNT PAIN NOTED NONE PRSNT: CPT | Performed by: PHYSICIAN ASSISTANT

## 2025-07-08 PROCEDURE — 51798 US URINE CAPACITY MEASURE: CPT | Performed by: PHYSICIAN ASSISTANT

## 2025-07-08 PROCEDURE — 99204 OFFICE O/P NEW MOD 45 MIN: CPT | Performed by: PHYSICIAN ASSISTANT

## 2025-07-08 PROCEDURE — 81003 URINALYSIS AUTO W/O SCOPE: CPT | Performed by: PHYSICIAN ASSISTANT

## 2025-07-08 PROCEDURE — 1159F MED LIST DOCD IN RCRD: CPT | Performed by: PHYSICIAN ASSISTANT

## 2025-07-08 PROCEDURE — 1036F TOBACCO NON-USER: CPT | Performed by: PHYSICIAN ASSISTANT

## 2025-07-08 RX ORDER — TAMSULOSIN HYDROCHLORIDE 0.4 MG/1
0.4 CAPSULE ORAL DAILY
Qty: 30 CAPSULE | Refills: 11 | Status: SHIPPED | OUTPATIENT
Start: 2025-07-08 | End: 2026-07-08

## 2025-07-08 ASSESSMENT — ENCOUNTER SYMPTOMS
RESPIRATORY NEGATIVE: 1
NEUROLOGICAL NEGATIVE: 1
EYES NEGATIVE: 1
GASTROINTESTINAL NEGATIVE: 1
ALLERGIC/IMMUNOLOGIC NEGATIVE: 1
CARDIOVASCULAR NEGATIVE: 1
ENDOCRINE NEGATIVE: 1
PSYCHIATRIC NEGATIVE: 1
HEMATOLOGIC/LYMPHATIC NEGATIVE: 1
DIFFICULTY URINATING: 1
MUSCULOSKELETAL NEGATIVE: 1
CONSTITUTIONAL NEGATIVE: 1

## 2025-07-08 ASSESSMENT — PAIN SCALES - GENERAL: PAINLEVEL_OUTOF10: 0-NO PAIN

## 2025-07-08 NOTE — PROGRESS NOTES
Subjective   Patient ID: Roni Nascimento is a 64 y.o. male who presents for Benign Prostatic Hypertrophy.  HPI  Patient is a 65 yo kindly referred by Juany Diaz for evaluation of BPH.   Patient reports urinary frequency, urgency and nocturia x 3-5. He denies straining with urination, intermittency or hesitance. He admits to a history microscopic hematuria which was not evaluated.  He denies gross hematuria, dysuria, or renal stones.     Patient is a non smoker, but reports he was exposed to heavy second hand smoking about 30 years ago.     Recent PSA is higher than baseline.     Office Visit on 07/08/2025   Component Date Value Ref Range Status    POC Color, Urine 07/08/2025 Yellow  Straw, Yellow, Light-Yellow Final    POC Appearance, Urine 07/08/2025 Clear  Clear Final    POC Glucose, Urine 07/08/2025 NEGATIVE  NEGATIVE mg/dl Final    POC Bilirubin, Urine 07/08/2025 NEGATIVE  NEGATIVE Final    POC Ketones, Urine 07/08/2025 NEGATIVE  NEGATIVE mg/dl Final    POC Specific Gravity, Urine 07/08/2025 1.015  1.005 - 1.035 Final    POC Blood, Urine 07/08/2025 MODERATE (2+) (A)  NEGATIVE Final    POC PH, Urine 07/08/2025 6.5  No Reference Range Established PH Final    POC Protein, Urine 07/08/2025 NEGATIVE  NEGATIVE mg/dl Final    POC Urobilinogen, Urine 07/08/2025 0.2  0.2, 1.0 EU/DL Final    Poc Nitrite, Urine 07/08/2025 NEGATIVE  NEGATIVE Final    POC Leukocytes, Urine 07/08/2025 NEGATIVE  NEGATIVE Final     PVR minimal     Lab Results   Component Value Date    PSA 3.2 05/27/2025    PSA 2.8 06/17/2024       ntains abnormal data PSA, total and free  Order: [id number]   Status: Final result       Dx: Screening for prostate cancer; Health...    Test Result Released: No (inaccessible in [facility])    0 Result Notes      Component  Ref Range & Units 1 mo ago   PSA, TOTAL  < OR = 4.0 ng/mL 3.2   PSA, FREE  ng/mL 0.6   PSA, % FREE  >25 % (calc) 19 Low    Comment:    PSA(ng/mL)      Free PSA(%)     Estimated(x) Probability                                        of Cancer(as%)  0-2.5              (*)               Approx. 1  2.6-4.0(1)         0-27(2)                   24(3)  4.1-10(4)          0-10                      56                     11-15                     28                     16-20                     20                     21-25                     16                     >or =26                   8  >10(+)             N/A                      >50     References:(1)Chelo et al.:Urology 60: 469-474 (2002)             (2)Chelo et al.:J.Urol 168: 922-925 (2002)                Free PSA(%)   Sensitivity(%)  Specificity(%)                < or = 25          85              19                < or = 30          93               9             (3)Catalona et al.:RICO 277: [id number] (1997)             (4)Catalona et al.:RICO 279: [id number] (1998)     (x)These estimates vary with age, ethnicity, family     history and FRANCISCO results.  (*)The diagnostic usefulness of % Free PSA has not been     established in patients with total PSA below 2.6 ng/mL  (+)In men with PSA above 10 ng/mL, prostate cancer risk is     determined by total PSA alone.     The Total PSA value from this assay system is  standardized against the equimolar PSA standard.  The test result will be approximately 20% higher  when compared to the WHO-standardized Total PSA  (Siemens assay). Comparison of serial PSA results  should be interpreted with this fact in mind.     PSA was performed using the [employer]  Immunoassay method. Values obtained from different  assay methods cannot be used interchangeably. PSA  levels, regardless of value, should not be interpreted  as absolute evidence of the presence or absence of  disease.      Resulting Agency [facility]             Specimen Collected: 05/27/25 13:48 Last Resulted: 05/29/25 11:47       PROSTATE SPECIFIC ANTIGEN (PSA)  Order: [id number]  Component  Ref Range & Units 2 yr ago   PSA  <4.00  ng/mL 1.95   Resulting Agency [facility]     Specimen Collected: 04/03/23 15:01    Performed by: [facility] Last Resulted: 04/04/23 00:05   Received From: [employer]  Result Received: 06/17/24 09:49     ntains abnormal data URINALYSIS WITH REFLEX CULTURE PERFORMABLE  Order: [id number]  Component  Ref Range & Units 2 yr ago   Color  Yellow Light Yellow   Appearance  Clear Clear   pH  5.0 - 8.0 6.0   Spec Gravity  1.005 - 1.030 1.025   Protein  Negative mg/dL Negative   Blood  Negative Moderate Abnormal    Bilirubin  Negative Negative   Urobilinogen  0.2 - 1.0 mg/dL 0.2   Ketones  Negative mg/dL Negative   Leuk. Esterase  Negative Negative   Nitrite  Negative Negative   Glucose  Negative mg/dL Negative   WBC  0 - 2 /HPF None Seen   RBC  0 - 2 /HPF 3-5 Abnormal    Mucous Threads Present   Squamous Epithelial  0 - 10 /HPF 0-2   Resulting Agency [facility]   Narrative  Performed by [facility]   A negative leukocyte esterase AND negative nitrite test or absence of       pyuria (urine WBC count <= 5-10) make a UTI (urinary tract infection)       very unlikely in a non-neutropenic adult (<=5% likelihood in many           studies).                                                                                                                       A positive leukocyte esterase, nitrite and/or pyuria is a nonspecific       result.  This can be seen in conditions other than a UTI e.g. asymptomatic   bacteriuria, gynecologic infections, sexually transmitted infections, and   noninfectious conditions (positive predictive value for UTI around 50%)      Specimen Collected: 07/20/22 12:29    Performed by: [facility] Last Resulted: 07/20/22 16:59   Received From: [facility] [facility]  Result Received: 06/17/24 09:49      Review of Systems   Constitutional: Negative.    HENT: Negative.     Eyes: Negative.    Respiratory: Negative.     Cardiovascular: Negative.     Gastrointestinal: Negative.    Endocrine: Negative.    Genitourinary:  Positive for difficulty urinating.   Musculoskeletal: Negative.    Skin: Negative.    Allergic/Immunologic: Negative.    Neurological: Negative.    Hematological: Negative.    Psychiatric/Behavioral: Negative.         Objective   Physical Exam  Constitutional:       General: He is not in acute distress.     Appearance: Normal appearance.   HENT:      Head: Normocephalic and atraumatic.      Nose: Nose normal.      Mouth/Throat:      Mouth: Mucous membranes are moist.   Cardiovascular:      Rate and Rhythm: Normal rate.   Pulmonary:      Effort: Pulmonary effort is normal.   Abdominal:      General: Abdomen is flat.      Palpations: Abdomen is soft.   Musculoskeletal:      Cervical back: Normal range of motion.   Neurological:      Mental Status: He is alert.         Assessment/Plan     Microscopic hematuria  I discussed the definition of microscopic hematuria 3 or more hours red blood cells under microscopic evaluation.  I discussed reasons of microscopic hematuria including renal, ureteral or bladder stones, urinary tract infection, or  malignancies.  I discussed full evaluation of microscopic hematuria including CT urogram and cystoscopy.      BPH  I discussed trial of Flomax  I discussed possible side effects including dizziness and retrograde ejaculation.     Follow up with Dr Hughes for cystoscopy, evaluate urinary issue and review PSA results.          Alex Almazan PA-C 07/08/25 1:33 PM

## 2025-07-09 ENCOUNTER — TELEPHONE (OUTPATIENT)
Dept: PRIMARY CARE | Facility: CLINIC | Age: 65
End: 2025-07-09
Payer: COMMERCIAL

## 2025-07-09 DIAGNOSIS — I10 PRIMARY HYPERTENSION: Primary | ICD-10-CM

## 2025-07-09 NOTE — TELEPHONE ENCOUNTER
Patient called requesting an order for labs for renal function. Patient stated he needs a recent lab for his kidney function for a radiology appointment coming up on 7/29/25

## 2025-07-10 LAB
APPEARANCE UR: CLEAR
BACTERIA #/AREA URNS HPF: ABNORMAL /HPF
BACTERIA UR CULT: NORMAL
BILIRUB UR QL STRIP: NEGATIVE
COLOR UR: YELLOW
GLUCOSE UR QL STRIP: NEGATIVE
HGB UR QL STRIP: ABNORMAL
HYALINE CASTS #/AREA URNS LPF: ABNORMAL /LPF
KETONES UR QL STRIP: NEGATIVE
LEUKOCYTE ESTERASE UR QL STRIP: NEGATIVE
NITRITE UR QL STRIP: NEGATIVE
PH UR STRIP: 6.5 [PH] (ref 5–8)
PROT UR QL STRIP: NEGATIVE
RBC #/AREA URNS HPF: ABNORMAL /HPF
SERVICE CMNT-IMP: ABNORMAL
SP GR UR STRIP: 1.01 (ref 1–1.03)
SQUAMOUS #/AREA URNS HPF: ABNORMAL /HPF
WBC #/AREA URNS HPF: ABNORMAL /HPF

## 2025-07-22 ENCOUNTER — TELEPHONE (OUTPATIENT)
Dept: PRIMARY CARE | Facility: CLINIC | Age: 65
End: 2025-07-22
Payer: COMMERCIAL

## 2025-07-22 ENCOUNTER — APPOINTMENT (OUTPATIENT)
Dept: GERIATRIC MEDICINE | Facility: CLINIC | Age: 65
End: 2025-07-22
Payer: COMMERCIAL

## 2025-07-22 ENCOUNTER — CLINICAL SUPPORT (OUTPATIENT)
Dept: PRIMARY CARE | Facility: CLINIC | Age: 65
End: 2025-07-22
Payer: COMMERCIAL

## 2025-07-22 DIAGNOSIS — I10 PRIMARY HYPERTENSION: ICD-10-CM

## 2025-07-22 NOTE — TELEPHONE ENCOUNTER
Called patient to cancel appointment (provider sick) msg states person not available, unable to leave message.

## 2025-07-23 ENCOUNTER — RESULTS FOLLOW-UP (OUTPATIENT)
Dept: GERIATRIC MEDICINE | Facility: CLINIC | Age: 65
End: 2025-07-23
Payer: COMMERCIAL

## 2025-07-23 LAB
ALBUMIN SERPL-MCNC: 4.5 G/DL (ref 3.6–5.1)
BUN SERPL-MCNC: 13 MG/DL (ref 7–25)
BUN/CREAT SERPL: NORMAL (CALC) (ref 6–22)
CALCIUM SERPL-MCNC: 9.4 MG/DL (ref 8.6–10.3)
CHLORIDE SERPL-SCNC: 102 MMOL/L (ref 98–110)
CO2 SERPL-SCNC: 24 MMOL/L (ref 20–32)
CREAT SERPL-MCNC: 0.91 MG/DL (ref 0.7–1.35)
EGFRCR SERPLBLD CKD-EPI 2021: 94 ML/MIN/1.73M2
GLUCOSE SERPL-MCNC: 87 MG/DL (ref 65–99)
PHOSPHATE SERPL-MCNC: 4.1 MG/DL (ref 2.1–4.3)
POTASSIUM SERPL-SCNC: 3.8 MMOL/L (ref 3.5–5.3)
PSA SERPL-MCNC: 2.5 NG/ML
SODIUM SERPL-SCNC: 138 MMOL/L (ref 135–146)

## 2025-07-24 NOTE — TELEPHONE ENCOUNTER
Called patient no answer, left voicemail to call office  ----- Message from Domi Diaz sent at 7/23/2025  4:41 PM EDT -----  Please contact patient and let him know that his Renal Function Panel was normal.  ----- Message -----  From: [facility] In  Sent: 7/23/2025  10:36 AM EDT  To: Domi Diaz, APRN-CNP

## 2025-07-29 ENCOUNTER — HOSPITAL ENCOUNTER (OUTPATIENT)
Dept: RADIOLOGY | Facility: HOSPITAL | Age: 65
Discharge: HOME | End: 2025-07-29
Payer: COMMERCIAL

## 2025-07-29 DIAGNOSIS — R35.1 NOCTURIA MORE THAN TWICE PER NIGHT: ICD-10-CM

## 2025-07-29 PROCEDURE — 74178 CT ABD&PLV WO CNTR FLWD CNTR: CPT

## 2025-07-29 PROCEDURE — 2550000001 HC RX 255 CONTRASTS: Performed by: PHYSICIAN ASSISTANT

## 2025-07-29 RX ADMIN — IOHEXOL 90 ML: 350 INJECTION, SOLUTION INTRAVENOUS at 11:34

## 2025-08-05 ENCOUNTER — OFFICE VISIT (OUTPATIENT)
Dept: GERIATRIC MEDICINE | Facility: CLINIC | Age: 65
End: 2025-08-05
Payer: COMMERCIAL

## 2025-08-05 VITALS
HEART RATE: 69 BPM | DIASTOLIC BLOOD PRESSURE: 84 MMHG | WEIGHT: 213.8 LBS | TEMPERATURE: 98 F | RESPIRATION RATE: 16 BRPM | BODY MASS INDEX: 32.4 KG/M2 | HEIGHT: 68 IN | SYSTOLIC BLOOD PRESSURE: 149 MMHG

## 2025-08-05 DIAGNOSIS — N40.1 BPH WITH ELEVATED PSA AND LOWER URINARY TRACT SYMPTOMS: ICD-10-CM

## 2025-08-05 DIAGNOSIS — R97.20 ELEVATED PSA: Primary | ICD-10-CM

## 2025-08-05 DIAGNOSIS — R97.20 BPH WITH ELEVATED PSA AND LOWER URINARY TRACT SYMPTOMS: ICD-10-CM

## 2025-08-05 DIAGNOSIS — I10 PRIMARY HYPERTENSION: ICD-10-CM

## 2025-08-05 PROBLEM — Z22.7 INACTIVE TUBERCULOSIS OF LUNG: Status: ACTIVE | Noted: 2019-07-24

## 2025-08-05 PROBLEM — M79.601 CHRONIC PAIN OF RIGHT UPPER EXTREMITY: Status: ACTIVE | Noted: 2022-04-25

## 2025-08-05 PROCEDURE — 3077F SYST BP >= 140 MM HG: CPT | Performed by: NURSE PRACTITIONER

## 2025-08-05 PROCEDURE — 1126F AMNT PAIN NOTED NONE PRSNT: CPT | Performed by: NURSE PRACTITIONER

## 2025-08-05 PROCEDURE — 99417 PROLNG OP E/M EACH 15 MIN: CPT | Performed by: NURSE PRACTITIONER

## 2025-08-05 PROCEDURE — 3008F BODY MASS INDEX DOCD: CPT | Performed by: NURSE PRACTITIONER

## 2025-08-05 PROCEDURE — 3079F DIAST BP 80-89 MM HG: CPT | Performed by: NURSE PRACTITIONER

## 2025-08-05 PROCEDURE — 1036F TOBACCO NON-USER: CPT | Performed by: NURSE PRACTITIONER

## 2025-08-05 PROCEDURE — 1159F MED LIST DOCD IN RCRD: CPT | Performed by: NURSE PRACTITIONER

## 2025-08-05 PROCEDURE — 99215 OFFICE O/P EST HI 40 MIN: CPT | Performed by: NURSE PRACTITIONER

## 2025-08-05 PROCEDURE — 1160F RVW MEDS BY RX/DR IN RCRD: CPT | Performed by: NURSE PRACTITIONER

## 2025-08-05 SDOH — ECONOMIC STABILITY: FOOD INSECURITY: WITHIN THE PAST 12 MONTHS, THE FOOD YOU BOUGHT JUST DIDN'T LAST AND YOU DIDN'T HAVE MONEY TO GET MORE.: SOMETIMES TRUE

## 2025-08-05 SDOH — ECONOMIC STABILITY: FOOD INSECURITY: WITHIN THE PAST 12 MONTHS, YOU WORRIED THAT YOUR FOOD WOULD RUN OUT BEFORE YOU GOT MONEY TO BUY MORE.: SOMETIMES TRUE

## 2025-08-05 ASSESSMENT — PATIENT HEALTH QUESTIONNAIRE - PHQ9
2. FEELING DOWN, DEPRESSED OR HOPELESS: NOT AT ALL
1. LITTLE INTEREST OR PLEASURE IN DOING THINGS: NOT AT ALL
SUM OF ALL RESPONSES TO PHQ9 QUESTIONS 1 AND 2: 0

## 2025-08-05 ASSESSMENT — VISUAL ACUITY: OU: 1

## 2025-08-05 ASSESSMENT — ENCOUNTER SYMPTOMS
DEPRESSION: 0
OCCASIONAL FEELINGS OF UNSTEADINESS: 0
LOSS OF SENSATION IN FEET: 0

## 2025-08-05 ASSESSMENT — LIFESTYLE VARIABLES: HOW MANY STANDARD DRINKS CONTAINING ALCOHOL DO YOU HAVE ON A TYPICAL DAY: PATIENT DOES NOT DRINK

## 2025-08-05 ASSESSMENT — PAIN SCALES - GENERAL: PAINLEVEL_OUTOF10: 0-NO PAIN

## 2025-08-05 NOTE — PROGRESS NOTES
"    Division: Geriatrics  Date of Service: 08/05/25    Visit Type: Geriatrics Clinic Follow-up Visit    Subjective   Mr. Roni Nascimento is 64 y.o. year old male and here for f/u of Follow-up, Elevated PSA (6.5), and Hypertension. Here with self.     Last visit: Per pt discussion/summary:     Previous visit note: 5/27/2025   Roni Nascimento is a pleasant 64 y.o. AA male who presents today for geriatric assessment and to establish care. This patient is a former patient of KATHRYN Bautista. Their last visit with LILIAM Manuel was 6/17/2024 for NPV. Today, patient presents for: New Patient Visit; Hypertension; Exposure to STD; Prediabetes; Med Refill. PMH significant for Primary hypertension; Prediabetes; H/O diffuse large B-cell lymphoma; Hep C antibody test positive; arthritis; Hematuria; Muscle tension dysphonia; Obesity with body mass index 30 or greater; Sciatica; and TB lung, latent. Last brain imaging: None. MoCA/MMSE: None. Pt came alone so the history is as accurate as and limited by his own report/memory. Main concerns: has run out of all medications (previously Lost to Care), especially concerned about hypertension, needs updated labs, requesting STD screening tests. Shared family history of diabetes, personal h/o lymphoma, bilateral torn rotator cuffs, need to see podiatrist, eye doctor. Reports often runs out of food by the end of the month.    Previous Note: 6/17/2024, Maryan Manuel CNP  Subjective   Roni Nascimento is a 63 y.o. male who presents for new patient visit   HPI  Mr. Nascimento is a 62 yo M here today for NPV.    Patient with reported b-cell lymphoma in 2011 (in remission), HTN with noncompliance, GERD, and prediabetes.     He was previously taking blood pressure medications.   Denies headache, chest pain, palpitations, blurred vision, or dizziness    He last had blood work last year with prediabetes noted. Denies polydipsia, polyuria, headache, blurred vision, or lightheadedness    Notes that he has been \"in transition\" and " "has just recently made changes to his diet   Family history: Grandfather  of some form of cancer in his 80s.   Father and brother with diabetes, and  of complications with diabetes.     Tobacco: Denies  ETOH: Denies   Illicits: Denies     Notes seasonal allergies for the last few weeks with runny nose and some eye drops.   He did take a medication that was OTC     GERD vs. PND- he has historically taken medication PRN for GERD.     All systems reviewed. Review of systems negative except for noted positives in HPI    Objective     BP (!) 148/94   Pulse 86   Temp 36.2 °C (97.2 °F)   Resp 16   Ht 1.753 m (5' 9\")   Wt 106 kg (234 lb)   SpO2 95%   BMI 34.56 kg/m²    Vital signs noted and reviewed.     Physical Exam  Constitutional:       Appearance: Normal appearance.   Cardiovascular:      Rate and Rhythm: Normal rate and regular rhythm.   Pulmonary:      Effort: Pulmonary effort is normal. No respiratory distress.      Breath sounds: Normal breath sounds.   Skin:     General: Skin is warm and dry.   Neurological:      Mental Status: He is oriented to person, place, and time.   Psychiatric:         Mood and Affect: Mood normal.     Assessment/Plan   Problem List Items Addressed This Visit      H/O diffuse large B-cell lymphoma   Relevant Orders   PSA, total and free (Completed)   Primary hypertension   Relevant Orders   Referral to Nutrition Services   Comprehensive metabolic panel (Completed)   CBC (Completed)    Other Visit Diagnoses      Prediabetes    -  Primary   Relevant Orders   Referral to Nutrition Services   Comprehensive metabolic panel (Completed)   Suspected sleep apnea       Relevant Orders   Referral to Adult Sleep Medicine   Resistant hypertension       Relevant Orders   TSH with reflex to Free T4 if abnormal (Completed)   Low HDL (under 40)       Screening for STD (sexually transmitted disease)       Relevant Orders   Syphilis Screen with Reflex (Completed)   C. Trachomatis / N. " "Gonorrhoeae, Amplified Detection (Completed)   Trichomonas vaginalis, Nucleic Acid Detection (Completed)   HIV 1/2 Antigen/Antibody Screen with Reflex to Confirmation (Completed)   Seasonal allergies       Relevant Medications   fluticasone (Flonase) 50 mcg/actuation nasal spray   cetirizine (ZyrTEC) 10 mg tablet  ==========================================    HPI  Roni Nascimento is a pleasant 64 y.o. AA male who presents today for a geriatric medicine and primary care follow-up. PMH significant for hypertension; elevated PSA; prediabetes; H/O diffuse large B-cell lymphoma; Hep C antibody test positive; arthritis; Hematuria; Muscle tension dysphonia; Obesity with body mass index 30 or greater; Sciatica; and TB lung, latent, previously Lost to Care. Food insecurity. Last brain imaging: None. MoCA/MMSE: None. Their last visit was 5/27/2025. Today, patient presents for: Follow-up; Elevated PSA: 6.5; Hypertension. Pt came alone so the history is as accurate as and limited by his own report/memory. Today, patient's main concern is elevated PSA.  Patient had follow-up with Alex Almazan PA-C, who prescribed follow-up Flomax.  Patient reports researching drug information and decided not to begin taking it.  Instead he has chosen alternative \"natural' treatment including: Watermelon, tomatoes, tumeric, cranberries, peppers.  Request vitamin D refill.  Reviewed results of CT urography, radiologist recommended urology follow-up.  Patient with urology appointment later this month.  Follow-up with LASHELL Almazan in December.  Agreed to review CT results with urologist.       History obtained from caregiver: NA  Hospitalization/ER visits: N  Memory: N  Mood changes: N  Sleep: N  Falls: N  Med Changes/OTC meds:Y. Did not start Flomax  Driving:Y  Pain: N  BM: N  Appetite: N  Weight: N  Home environment/Living Situation: Stable. Single family home    PCP: Domi Diaz, APRN-CNP    Medical records reviewed.  Medical History[1]  Surgical " "History[2]  Family History[3]  Social History     Tobacco Use    Smoking status: Never    Smokeless tobacco: Never   Substance Use Topics    Alcohol use: Never       Advance Directives/Legal/Financial    Patient Healthcare POA: NA         Is Healthcare POA currently scanned into patient's chart: NAM      DPOA for finances: NA       Legal guardian:  NAM     Living Will: no     ? no      ENCOUNTER SCREENING RESULTS             Over the past 2 weeks, how often have you been bothered by any of the following problems?  Little interest or pleasure in doing things: Not at all  Feeling down, depressed, or hopeless: Not at all  Patient Health Questionnaire-2 Score: 0                 MIS: NA/15    Clock Drawing Test (CDT): NA/7     Daily Functioning Assessment                       Safety       Living situation:       History of Abuse/Neglect/exploitation: N      Medications reviewed and reconciled.   Current Medications[4]    Objective   /84   Pulse 69   Temp 36.7 °C (98 °F) (Temporal)   Resp 16   Ht 1.727 m (5' 8\")   Wt 97 kg (213 lb 12.8 oz)   BMI 32.51 kg/m²   Physical Exam  Vitals reviewed.   Constitutional:       General: He is awake. He is not in acute distress.     Appearance: Normal appearance. He is well-groomed. He is obese. He is not ill-appearing, toxic-appearing or diaphoretic.   HENT:      Head: Normocephalic and atraumatic.      Right Ear: Hearing normal.      Left Ear: Hearing normal.      Mouth/Throat:      Mouth: Mucous membranes are moist.     Eyes:      General: Lids are normal. Vision grossly intact. Gaze aligned appropriately.      Conjunctiva/sclera: Conjunctivae normal.       Cardiovascular:      Rate and Rhythm: Normal rate and regular rhythm.   Pulmonary:      Effort: Pulmonary effort is normal. No respiratory distress.     Musculoskeletal:         General: Normal range of motion.      Cervical back: Normal range of motion.     Skin:     General: Skin is dry.      Coloration: Skin " "is not jaundiced.      Findings: No bruising or rash.     Neurological:      Mental Status: He is alert. Mental status is at baseline.      GCS: GCS eye subscore is 4. GCS verbal subscore is 5. GCS motor subscore is 6.      Cranial Nerves: No facial asymmetry.     Psychiatric:         Attention and Perception: Attention normal.         Mood and Affect: Affect normal. Mood is anxious.         Speech: Speech is rapid and pressured.         Behavior: Behavior normal. Behavior is cooperative.         Thought Content: Thought content is paranoid.         Cognition and Memory: Memory normal.         Judgment: Judgment is impulsive.           Labs/Imaging reviewed.  Lab Results   Component Value Date    WBC 5.5 05/27/2025    HGB 15.2 05/27/2025    HCT 47.6 05/27/2025    MCV 91.5 05/27/2025     05/27/2025    LYMPHOPCT 41.6 05/27/2025    RBC 5.20 05/27/2025    MCH 29.2 05/27/2025    MCHC 31.9 (L) 05/27/2025    RDW 13.0 05/27/2025     Lab Results   Component Value Date     07/22/2025    K 3.8 07/22/2025     07/22/2025    CO2 24 07/22/2025    BUN 13 07/22/2025    CREATININE 0.91 07/22/2025    GLUCOSE 87 07/22/2025    CALCIUM 9.4 07/22/2025    PROT 7.4 05/27/2025    BILITOT 1.0 05/27/2025    ALKPHOS 52 05/27/2025    AST 19 05/27/2025    ALT 19 05/27/2025     Lab Results   Component Value Date    TSH 0.89 05/27/2025    TSH 0.79 06/17/2024     No results found for: \"FREET4\"  Lab Results   Component Value Date    GOHLYFQY68 276 05/27/2025     Lab Results   Component Value Date    HGBA1C 5.7 (H) 05/27/2025    HGBA1C 6.3 (H) 10/12/2023    HGBA1C 6.1 (H) 04/03/2023     Lab Results   Component Value Date    VITD25 31 05/27/2025        No results found for this or any previous visit from the past 365 days.     No results found for this or any previous visit from the past 365 days.     No results found for this or any previous visit from the past 365 days.      Assessment/Plan      Discussed case with: patient,  " "facility staff (nurse, aid, PT/OT/SLP, SW and/or DON), and medical records    1. Elevated PSA (Primary)  Last PSA 6.5, previous 3.2 up from 6.8 in 6/2024  Seen by Alex Almazan PA-C (next appt 12/2025), completed CT urography, scheduled for urology follow-up 8/26/2025.  - Follow Up In Geriatrics; Future    2. BPH with elevated PSA and lower urinary tract symptoms  Patient declines taking Flomax d/t concern for side effects. Instead plans to use \"natural\" treatment: watermelon, tumeric, tomatoes.    3. Primary hypertension  - c/w amLODIPine-benazepriL (Lotrel) 10-40 mg capsule, Take 1 capsule by mouth once mann  - c/w hydroCHLOROthiazide (Microzide) 12.5 mg capsule, Take 1 capsule (12.5 mg) by mouth once daily.  - Follow Up In Geriatrics; Future       Time Spent  Prep time on day of patient encounter: 0 minutes  Time spent directly with patient, family or caregiver: 53 minutes  Additional Time Spent on Patient Care Activities: 0 minutes  Documentation Time: 20 minutes  Other Time Spent: 0 minutes  Total: 73 minutes       Electronically signed by: KATHRYN Blair         [1] History reviewed. No pertinent past medical history.  [2] History reviewed. No pertinent surgical history.  [3] No family history on file.  [4]   Current Outpatient Medications   Medication Sig Dispense Refill    amLODIPine-benazepriL (Lotrel) 10-40 mg capsule Take 1 capsule by mouth once daily. 90 capsule 3    cetirizine (ZyrTEC) 10 mg tablet Take 1 tablet (10 mg) by mouth once daily. 30 tablet 2    Fish OiL 1,200 (144-216) mg capsule Take 1 capsule (1,200 mg) by mouth 3 times a day. 90 capsule 0    hydroCHLOROthiazide (Microzide) 12.5 mg capsule Take 1 capsule (12.5 mg) by mouth once daily. 90 capsule 3    Vitamin D3 50 mcg (2,000 unit) tablet Take 1 tablet (2,000 Units) by mouth once daily.      blood pressure monitor kit 1 kit 1 time if needed (blood pressure) for up to 1 dose. 1 kit 0     No current facility-administered medications " for this visit.

## 2025-08-11 ENCOUNTER — APPOINTMENT (OUTPATIENT)
Dept: OPHTHALMOLOGY | Facility: CLINIC | Age: 65
End: 2025-08-11
Payer: COMMERCIAL

## 2025-08-20 DIAGNOSIS — Z12.11 COLON CANCER SCREENING: Primary | ICD-10-CM

## 2025-08-20 RX ORDER — POLYETHYLENE GLYCOL 3350, SODIUM CHLORIDE, SODIUM BICARBONATE, POTASSIUM CHLORIDE 420; 11.2; 5.72; 1.48 G/4L; G/4L; G/4L; G/4L
4000 POWDER, FOR SOLUTION ORAL ONCE
Qty: 4000 ML | Refills: 0 | Status: SHIPPED | OUTPATIENT
Start: 2025-08-20 | End: 2025-08-20

## 2025-08-21 ENCOUNTER — APPOINTMENT (OUTPATIENT)
Dept: UROLOGY | Facility: CLINIC | Age: 65
End: 2025-08-21
Payer: COMMERCIAL

## 2025-08-21 VITALS
DIASTOLIC BLOOD PRESSURE: 84 MMHG | HEART RATE: 75 BPM | WEIGHT: 213.8 LBS | SYSTOLIC BLOOD PRESSURE: 119 MMHG | BODY MASS INDEX: 31.67 KG/M2 | TEMPERATURE: 97.7 F | HEIGHT: 69 IN

## 2025-08-21 DIAGNOSIS — R31.0 GROSS HEMATURIA: ICD-10-CM

## 2025-08-21 DIAGNOSIS — N28.1 KIDNEY CYST, ACQUIRED: ICD-10-CM

## 2025-08-21 DIAGNOSIS — R97.20 ELEVATED PSA: Primary | ICD-10-CM

## 2025-08-21 DIAGNOSIS — R31.29 MICROSCOPIC HEMATURIA: ICD-10-CM

## 2025-08-21 LAB
POC APPEARANCE, URINE: CLEAR
POC BILIRUBIN, URINE: NEGATIVE
POC BLOOD, URINE: ABNORMAL
POC COLOR, URINE: YELLOW
POC GLUCOSE, URINE: NEGATIVE MG/DL
POC KETONES, URINE: NEGATIVE MG/DL
POC LEUKOCYTES, URINE: NEGATIVE
POC NITRITE,URINE: NEGATIVE
POC PH, URINE: 6.5 PH
POC PROTEIN, URINE: NEGATIVE MG/DL
POC SPECIFIC GRAVITY, URINE: 1.02
POC UROBILINOGEN, URINE: 1 EU/DL

## 2025-08-21 PROCEDURE — 99215 OFFICE O/P EST HI 40 MIN: CPT | Performed by: STUDENT IN AN ORGANIZED HEALTH CARE EDUCATION/TRAINING PROGRAM

## 2025-08-21 PROCEDURE — 88112 CYTOPATH CELL ENHANCE TECH: CPT

## 2025-08-21 PROCEDURE — 88112 CYTOPATH CELL ENHANCE TECH: CPT | Performed by: PATHOLOGY

## 2025-08-21 PROCEDURE — 52000 CYSTOURETHROSCOPY: CPT | Performed by: STUDENT IN AN ORGANIZED HEALTH CARE EDUCATION/TRAINING PROGRAM

## 2025-08-21 PROCEDURE — 81003 URINALYSIS AUTO W/O SCOPE: CPT | Performed by: STUDENT IN AN ORGANIZED HEALTH CARE EDUCATION/TRAINING PROGRAM

## 2025-08-21 ASSESSMENT — PAIN SCALES - GENERAL: PAINLEVEL_OUTOF10: 0-NO PAIN

## 2025-08-22 ENCOUNTER — APPOINTMENT (OUTPATIENT)
Dept: GASTROENTEROLOGY | Facility: HOSPITAL | Age: 65
End: 2025-08-22
Payer: COMMERCIAL

## 2025-08-22 LAB
LABORATORY COMMENT REPORT: NORMAL
LABORATORY COMMENT REPORT: NORMAL
PATH REPORT.FINAL DX SPEC: NORMAL
PATH REPORT.GROSS SPEC: NORMAL
PATH REPORT.RELEVANT HX SPEC: NORMAL
PATH REPORT.TOTAL CANCER: NORMAL

## 2025-09-04 ENCOUNTER — APPOINTMENT (OUTPATIENT)
Dept: UROLOGY | Facility: CLINIC | Age: 65
End: 2025-09-04
Payer: COMMERCIAL

## 2025-09-11 ENCOUNTER — APPOINTMENT (OUTPATIENT)
Dept: OPHTHALMOLOGY | Facility: CLINIC | Age: 65
End: 2025-09-11
Payer: COMMERCIAL

## 2025-10-13 ENCOUNTER — APPOINTMENT (OUTPATIENT)
Dept: PODIATRY | Facility: CLINIC | Age: 65
End: 2025-10-13
Payer: COMMERCIAL

## 2025-11-03 ENCOUNTER — APPOINTMENT (OUTPATIENT)
Dept: OPHTHALMOLOGY | Facility: CLINIC | Age: 65
End: 2025-11-03
Payer: COMMERCIAL